# Patient Record
Sex: FEMALE | Race: WHITE | URBAN - METROPOLITAN AREA
[De-identification: names, ages, dates, MRNs, and addresses within clinical notes are randomized per-mention and may not be internally consistent; named-entity substitution may affect disease eponyms.]

---

## 2021-07-14 ENCOUNTER — OFFICE VISIT (OUTPATIENT)
Dept: URBAN - METROPOLITAN AREA CLINIC 105 | Facility: CLINIC | Age: 36
End: 2021-07-14
Payer: COMMERCIAL

## 2021-07-14 ENCOUNTER — WEB ENCOUNTER (OUTPATIENT)
Dept: URBAN - METROPOLITAN AREA CLINIC 105 | Facility: CLINIC | Age: 36
End: 2021-07-14

## 2021-07-14 ENCOUNTER — LAB OUTSIDE AN ENCOUNTER (OUTPATIENT)
Dept: URBAN - METROPOLITAN AREA CLINIC 105 | Facility: CLINIC | Age: 36
End: 2021-07-14

## 2021-07-14 VITALS
TEMPERATURE: 97.2 F | DIASTOLIC BLOOD PRESSURE: 74 MMHG | BODY MASS INDEX: 20.2 KG/M2 | WEIGHT: 136.4 LBS | HEART RATE: 67 BPM | HEIGHT: 69 IN | SYSTOLIC BLOOD PRESSURE: 112 MMHG

## 2021-07-14 DIAGNOSIS — K57.92 DIVERTICULITIS: ICD-10-CM

## 2021-07-14 PROCEDURE — 99204 OFFICE O/P NEW MOD 45 MIN: CPT | Performed by: INTERNAL MEDICINE

## 2021-07-14 RX ORDER — METRONIDAZOLE 500 MG/1
1 TABLET TABLET ORAL THREE TIMES A DAY
Status: ACTIVE | COMMUNITY

## 2021-07-14 RX ORDER — CIPROFLOXACIN HYDROCHLORIDE 500 MG/1
1 TABLET TABLET, FILM COATED ORAL
Status: ACTIVE | COMMUNITY

## 2021-07-14 NOTE — PHYSICAL EXAM GASTROINTESTINAL
Abdomen , soft, nontender, nondistended , no guarding or rigidity , no masses palpable , normal bowel sounds  hair removal not indicated

## 2021-07-14 NOTE — HPI-TODAY'S VISIT:
The patient presents on hospital follow-up for diverticulitis.  Today, the patient presents following an ER visit at [facility] where she was diagnosed with diverticulitis. Symptoms started as bloating followed by onset of LUQ subcostal pain radiating to the back as well as a fever. She was sent home on a 10-day regimen of Cipro and Metronidazole TID. Pain and fever have resolved, but bloating remains. ER note reports the pt presented with additional symptoms like nausea and diarrhea - diarrhea persists. She has 1 low-volume, soft/watery BM/day. She is on a bland diet currently.   She initially received B12 shots, but later switched to supplementation - B12 levels have normalized on recent labs, but will take supplementation occasionally.    Labs 7/8/21 - CMP normal except BUN 5. CBC normal. 8/5/20 - A1c 4.5. CMP, TSH, lipid panel, vit B12/D all normal. 8/31/18 - Vit D 24.5. 6/8/18 - Vit B12 228.

## 2021-07-20 ENCOUNTER — TELEPHONE ENCOUNTER (OUTPATIENT)
Dept: URBAN - METROPOLITAN AREA CLINIC 105 | Facility: CLINIC | Age: 36
End: 2021-07-20

## 2021-07-20 RX ORDER — METRONIDAZOLE 500 MG/1
1 TABLET TABLET ORAL THREE TIMES A DAY
Qty: 30 TABLET | Refills: 0 | OUTPATIENT

## 2021-07-20 RX ORDER — CIPROFLOXACIN HYDROCHLORIDE 500 MG/1
1 TABLET TABLET, FILM COATED ORAL
Qty: 20 TABLET | Refills: 0 | OUTPATIENT

## 2021-08-03 ENCOUNTER — OFFICE VISIT (OUTPATIENT)
Dept: URBAN - METROPOLITAN AREA CLINIC 105 | Facility: CLINIC | Age: 36
End: 2021-08-03
Payer: COMMERCIAL

## 2021-08-03 ENCOUNTER — DASHBOARD ENCOUNTERS (OUTPATIENT)
Age: 36
End: 2021-08-03

## 2021-08-03 VITALS
WEIGHT: 133 LBS | DIASTOLIC BLOOD PRESSURE: 74 MMHG | HEIGHT: 69 IN | BODY MASS INDEX: 19.7 KG/M2 | HEART RATE: 63 BPM | TEMPERATURE: 97.9 F | SYSTOLIC BLOOD PRESSURE: 120 MMHG

## 2021-08-03 DIAGNOSIS — K57.92 DIVERTICULITIS: ICD-10-CM

## 2021-08-03 PROBLEM — 307496006: Status: ACTIVE | Noted: 2021-07-14

## 2021-08-03 PROCEDURE — 99213 OFFICE O/P EST LOW 20 MIN: CPT | Performed by: INTERNAL MEDICINE

## 2021-08-03 RX ORDER — METRONIDAZOLE 500 MG/1
1 TABLET TABLET ORAL THREE TIMES A DAY
Qty: 30 TABLET | Refills: 0 | Status: ACTIVE | COMMUNITY

## 2021-08-03 RX ORDER — CIPROFLOXACIN HYDROCHLORIDE 500 MG/1
1 TABLET TABLET, FILM COATED ORAL
Qty: 20 TABLET | Refills: 0 | Status: ACTIVE | COMMUNITY

## 2021-08-03 NOTE — HPI-TODAY'S VISIT:
The patient presents on hospital follow-up for diverticulitis.  On 7/14/21, the patient presented following an ER visit at [facility] where she was diagnosed with diverticulitis. Symptoms started as bloating followed by onset of LUQ subcostal pain radiating to the back as well as a fever. She was sent home on a 10-day regimen of Cipro and Metronidazole TID. Pain and fever had resolved, but bloating remained. ER note reported the pt presented with additional symptoms like nausea and diarrhea - diarrhea persisted. She had 1 low-volume, soft/watery BM/day. She was on a bland diet currently.   She initially received B12 shots, but later switched to supplementation - B12 levels have normalized on recent labs, but would take supplementation occasionally.   Today, she says she finished her second round of abx treatment for diverticulitis on July 30/31. Her last episode of LLQ pain was in mid-July. No fever. She now notes an occasional epigastric "poking" sensation that was previously in the lower abdomen.  She continues to have looser stools she states. She reports increased abdominal gurgling sounds over the past month.  Labs 7/8/21 - CMP normal except BUN 5. CBC normal. 8/5/20 - A1c 4.5. CMP, TSH, lipid panel, vit B12/D all normal. 8/31/18 - Vit D 24.5. 6/8/18 - Vit B12 228.

## 2021-08-27 ENCOUNTER — OFFICE VISIT (OUTPATIENT)
Dept: URBAN - METROPOLITAN AREA SURGERY CENTER 16 | Facility: SURGERY CENTER | Age: 36
End: 2021-08-27
Payer: COMMERCIAL

## 2021-08-27 DIAGNOSIS — K57.30 ACQUIRED DIVERTICULOSIS OF COLON: ICD-10-CM

## 2021-08-27 DIAGNOSIS — K57.32 DIVERTICULITIS LARGE INTESTINE: ICD-10-CM

## 2021-08-27 PROCEDURE — G8907 PT DOC NO EVENTS ON DISCHARG: HCPCS | Performed by: INTERNAL MEDICINE

## 2021-08-27 PROCEDURE — 45378 DIAGNOSTIC COLONOSCOPY: CPT | Performed by: INTERNAL MEDICINE

## 2021-08-27 RX ORDER — METRONIDAZOLE 500 MG/1
1 TABLET TABLET ORAL THREE TIMES A DAY
Qty: 30 TABLET | Refills: 0 | Status: ACTIVE | COMMUNITY

## 2021-08-27 RX ORDER — CIPROFLOXACIN HYDROCHLORIDE 500 MG/1
1 TABLET TABLET, FILM COATED ORAL
Qty: 20 TABLET | Refills: 0 | Status: ACTIVE | COMMUNITY